# Patient Record
Sex: MALE | Race: WHITE | Employment: STUDENT | URBAN - METROPOLITAN AREA
[De-identification: names, ages, dates, MRNs, and addresses within clinical notes are randomized per-mention and may not be internally consistent; named-entity substitution may affect disease eponyms.]

---

## 2018-01-27 ENCOUNTER — APPOINTMENT (OUTPATIENT)
Dept: CT IMAGING | Age: 18
End: 2018-01-27
Attending: EMERGENCY MEDICINE
Payer: COMMERCIAL

## 2018-01-27 ENCOUNTER — HOSPITAL ENCOUNTER (EMERGENCY)
Age: 18
Discharge: HOME | End: 2018-01-27
Attending: EMERGENCY MEDICINE
Payer: COMMERCIAL

## 2018-01-27 VITALS
SYSTOLIC BLOOD PRESSURE: 128 MMHG | HEART RATE: 60 BPM | RESPIRATION RATE: 16 BRPM | DIASTOLIC BLOOD PRESSURE: 74 MMHG | HEIGHT: 70 IN | WEIGHT: 170 LBS | BODY MASS INDEX: 24.34 KG/M2 | OXYGEN SATURATION: 99 % | TEMPERATURE: 97.8 F

## 2018-01-27 DIAGNOSIS — R04.0 NOSEBLEED: Primary | ICD-10-CM

## 2018-01-27 PROCEDURE — 99283 EMERGENCY DEPT VISIT LOW MDM: CPT | Performed by: EMERGENCY MEDICINE

## 2018-01-27 PROCEDURE — 70486 CT MAXILLOFACIAL W/O DYE: CPT

## 2018-01-27 RX ORDER — DEXTROAMPHETAMINE SACCHARATE, AMPHETAMINE ASPARTATE, DEXTROAMPHETAMINE SULFATE AND AMPHETAMINE SULFATE 7.5; 7.5; 7.5; 7.5 MG/1; MG/1; MG/1; MG/1
30 TABLET ORAL DAILY
COMMUNITY

## 2018-01-27 RX ORDER — CEPHALEXIN 500 MG/1
500 CAPSULE ORAL 4 TIMES DAILY
Qty: 28 CAP | Refills: 0 | Status: SHIPPED | OUTPATIENT
Start: 2018-01-27 | End: 2018-02-03

## 2018-01-27 NOTE — ED PROVIDER NOTES
HPI Comments: Patient is a 22 yo male  at local school who presents with persistent nosebleed after being struck in the face with a baseball approximately 7 hours ago. Initially severe bleeding which has improved and by time of evaluation has stopped. Patient denies any LOC, no further injuries or complaints, well appearing, NAD. Patient is a 21 y.o. male presenting with nasal problem. The history is provided by the patient. No  was used. Nasal Injury    Pertinent negatives include no vomiting and no weakness. History reviewed. No pertinent past medical history. History reviewed. No pertinent surgical history. History reviewed. No pertinent family history. Social History     Social History    Marital status: SINGLE     Spouse name: N/A    Number of children: N/A    Years of education: N/A     Occupational History    Not on file. Social History Main Topics    Smoking status: Never Smoker    Smokeless tobacco: Current User    Alcohol use No    Drug use: No    Sexual activity: Not on file     Other Topics Concern    Not on file     Social History Narrative    No narrative on file         ALLERGIES: Review of patient's allergies indicates not on file. Review of Systems   Constitutional: Negative for chills and fever. HENT: Positive for nosebleeds. Negative for rhinorrhea and sore throat. Eyes: Negative for visual disturbance. Respiratory: Negative for cough and shortness of breath. Cardiovascular: Negative for chest pain and leg swelling. Gastrointestinal: Negative for abdominal pain, diarrhea, nausea and vomiting. Genitourinary: Negative for dysuria. Musculoskeletal: Negative for back pain and neck pain. Skin: Negative for rash. Neurological: Negative for weakness and headaches. Psychiatric/Behavioral: The patient is not nervous/anxious.         Vitals:    01/27/18 0108   BP: 127/76   Pulse: 94   Resp: 18   Temp: 98 °F (36.7 °C)   SpO2: 97%   Weight: 77.1 kg (170 lb)   Height: 5' 10\" (1.778 m)            Physical Exam   Constitutional: He is oriented to person, place, and time. He appears well-developed and well-nourished. HENT:   Head: Normocephalic. Right Ear: External ear normal.   Left Ear: External ear normal.   Eyes: Conjunctivae and EOM are normal. Pupils are equal, round, and reactive to light. Neck: Normal range of motion. Neck supple. No tracheal deviation present. Cardiovascular: Normal rate, regular rhythm, normal heart sounds and intact distal pulses. No murmur heard. Pulmonary/Chest: Effort normal and breath sounds normal. No respiratory distress. Abdominal: Soft. There is no tenderness. Musculoskeletal: Normal range of motion. Neurological: He is alert and oriented to person, place, and time. No cranial nerve deficit. Skin: No rash noted. Nursing note and vitals reviewed. MDM  Number of Diagnoses or Management Options  Nosebleed: new and requires workup     Amount and/or Complexity of Data Reviewed  Tests in the radiology section of CPT®: ordered and reviewed  Review and summarize past medical records: yes    Risk of Complications, Morbidity, and/or Mortality  Presenting problems: high  Diagnostic procedures: high  Management options: high    Patient Progress  Patient progress: stable    ED Course       Procedures    No results found for this or any previous visit (from the past 12 hour(s)). Ct Maxillofacial Wo Cont    Result Date: 1/27/2018  Clinical history: Hematoma, face. Continued nasal bleeding. TECHNIQUE: Axial, coronal and sagittal CT of the face without contrast. One or more of the following dose reduction techniques were used: automated exposure control, adjustment of the mA and/or kV according to patient size, use of iterative reconstruction technique. COMPARISON: None. FINDINGS: There is extensive comminuted nasal bone fracture. There is displacement.  There is also fracture of the nasal septum. There is associated nasal soft tissue swelling. There is hemorrhagic fluid in the nostrils. There is fracture through the right maxilla. There is large hemorrhagic fluid in the right maxillary sinus. There is depressed fracture of the right orbital floor. There is no herniation of orbital fat. There is no extraocular muscle entrapment. Globes are intact. The zygomatic arches, mandible, and a pterygoid processes are intact. Craniocervical junction is unremarkable. IMPRESSION: 1. Extensive comminuted nasal bone fracture as well as fracture of the nasal septum. Nasal bone fracture is contiguous with fracture through the right maxilla. Associated hemorrhagic fluid within the nostrils and the right maxillary sinus. 2. Depressed right orbital floor fracture. No significant herniation of the orbital fat. No extraocular muscle entrapment. Globes are intact. 3. Mandible, pterygoid processes and the zygomatic arches are intact. 20 yo male with nosebleed:    Bleeding has stopped and patient remains well appearing, NAD. He does have a small cut to external surface of nose which the physician at [facility] has already cleaned and glued so I will leave it as is. Discussed imaging findings with on call ENT Dr. Lexii Bailey who will see patient in office Monday (or patient will see Dr. Melita Garrison per  with patient who states they can always get in first thing due to him being the team doctor). Repeat exam with no entrapment of extra-ocular muscles, bleeding remains stopped, stable for discharge at this time.

## 2018-01-27 NOTE — DISCHARGE INSTRUCTIONS
Broken Nose: Care Instructions  Your Care Instructions    A broken nose is a break, or fracture, of the bone or cartilage. Most broken noses need only home care and a follow-up visit with a doctor. The swelling should go down in a few days. Bruises around your eyes and nose should go away in 2 to 3 weeks. You heal best when you take good care of yourself. Eat a variety of healthy foods, and don't smoke. Follow-up care is a key part of your treatment and safety. Be sure to make and go to all appointments, and call your doctor if you are having problems. It's also a good idea to know your test results and keep a list of the medicines you take. How can you care for yourself at home? · If you have a nasal splint or packing, leave it in place until a doctor removes it. · If your doctor prescribed antibiotics, take them as directed. Do not stop taking them just because you feel better. You need to take the full course of antibiotics. · Take decongestants as directed to help you breathe after the splint or packing is removed. Your doctor may give you a prescription or suggest over-the-counter medicine. · Be safe with medicines. Take pain medicines exactly as directed. ¨ If the doctor gave you a prescription medicine for pain, take it as prescribed. ¨ If you are not taking a prescription pain medicine, ask your doctor if you can take an over-the-counter medicine. · Put ice or a cold pack on your nose for 10 to 20 minutes at a time. Try to do this every 1 to 2 hours for the first 3 days (when you are awake) or until the swelling goes down. Put a thin cloth between the ice pack and your skin. · Sleep with your head slightly raised until the swelling goes down. Prop up your head and shoulders on pillows. · Do not play contact sports for 6 weeks. When should you call for help? Call 911 anytime you think you may need emergency care. For example, call if:  ? · You have trouble breathing.    ? · You passed out (lost consciousness). ?Call your doctor now or seek immediate medical care if:  ? · You have signs of infection, such as:  ¨ Increased pain, swelling, warmth, or redness. ¨ Red streaks leading from the area. ¨ Pus draining from the area. ¨ A fever. ? · You have clear fluid draining from your nose. ? · You have vision changes. ? · Your nose is bleeding. ? · You have new or worse pain. ? Watch closely for changes in your health, and be sure to contact your doctor if:  ? · You do not get better as expected. Where can you learn more? Go to http://lawanda-omar.info/. Enter Y345 in the search box to learn more about \"Broken Nose: Care Instructions. \"  Current as of: March 21, 2017  Content Version: 11.4  © 0550-7675 [employer]. Care instructions adapted under license by [employer] (which disclaims liability or warranty for this information). If you have questions about a medical condition or this instruction, always ask your healthcare professional. [place] [address] any warranty or liability for your use of this information.

## 2018-01-27 NOTE — ED NOTES
Discharge instructions and 0 prescriptions reviewed with pt. Pt verbalized understanding. VSS. Discharged home in stable condition, ambulatory with steady gait, accompanied by family.

## 2018-01-27 NOTE — ED TRIAGE NOTES
Pt complains of continued nose bleeding. Pt was struck by baseball at practice at approximately 1630.

## 2018-01-31 ENCOUNTER — HOSPITAL ENCOUNTER (OUTPATIENT)
Dept: SURGERY | Age: 18
Discharge: HOME | End: 2018-01-31

## 2018-02-01 VITALS — BODY MASS INDEX: 25.18 KG/M2 | WEIGHT: 170 LBS | HEIGHT: 69 IN

## 2018-02-01 RX ORDER — ACETAMINOPHEN 325 MG/1
TABLET ORAL
COMMUNITY

## 2018-02-01 NOTE — PERIOP NOTES
Patient verified name, , and surgery as listed in [facility]. Type 1b surgery, phone assessment complete. Orders NOT received. Labs per surgeon: none  Labs per anesthesia protocol: none      Patient answered medical/surgical history questions at their best of ability. All prior to admission medications documented in [facility]. Patient instructed to take the following medications the day of surgery according to anesthesia guidelines with a small sip of water: Adderall . Hold all vitamins 7 days prior to surgery and NSAIDS 5 days prior to surgery. Medications to be held none    Patient instructed on the following:  Arrive at A Entrance, time of arrival to be called the day before by 1700  NPO after midnight including gum, mints, and ice chips  Responsible adult must drive patient to the hospital, stay during surgery, and patient will  need supervision 24 hours after anesthesia  Use antibacterial soap in shower the night before surgery and on the morning of surgery  Leave all valuables (money and jewelry) at home but bring insurance card and ID on       DOS  Do not wear make-up, nail polish, lotions, cologne, perfumes, powders, or oil on skin. Patient teach back successful and patient demonstrates knowledge of instruction.

## 2018-02-04 ENCOUNTER — ANESTHESIA EVENT (OUTPATIENT)
Dept: SURGERY | Age: 18
End: 2018-02-04
Payer: COMMERCIAL

## 2018-02-05 ENCOUNTER — HOSPITAL ENCOUNTER (OUTPATIENT)
Age: 18
Setting detail: OUTPATIENT SURGERY
Discharge: HOME | End: 2018-02-05
Attending: OTOLARYNGOLOGY | Admitting: OTOLARYNGOLOGY
Payer: COMMERCIAL

## 2018-02-05 ENCOUNTER — ANESTHESIA (OUTPATIENT)
Dept: SURGERY | Age: 18
End: 2018-02-05
Payer: COMMERCIAL

## 2018-02-05 VITALS
OXYGEN SATURATION: 96 % | HEART RATE: 94 BPM | RESPIRATION RATE: 16 BRPM | WEIGHT: 168.43 LBS | TEMPERATURE: 99 F | SYSTOLIC BLOOD PRESSURE: 116 MMHG | HEIGHT: 69 IN | BODY MASS INDEX: 24.95 KG/M2 | DIASTOLIC BLOOD PRESSURE: 82 MMHG

## 2018-02-05 PROCEDURE — 77030008355 HC SPLNT NSL EXT MEDT -B: Performed by: OTOLARYNGOLOGY

## 2018-02-05 PROCEDURE — 76010000149 HC OR TIME 1 TO 1.5 HR: Performed by: OTOLARYNGOLOGY

## 2018-02-05 PROCEDURE — 74011250636 HC RX REV CODE- 250/636

## 2018-02-05 PROCEDURE — 74011250636 HC RX REV CODE- 250/636: Performed by: ANESTHESIOLOGY

## 2018-02-05 PROCEDURE — 76060000033 HC ANESTHESIA 1 TO 1.5 HR: Performed by: OTOLARYNGOLOGY

## 2018-02-05 PROCEDURE — 74011000250 HC RX REV CODE- 250

## 2018-02-05 PROCEDURE — 76210000016 HC OR PH I REC 1 TO 1.5 HR: Performed by: OTOLARYNGOLOGY

## 2018-02-05 PROCEDURE — 76210000021 HC REC RM PH II 0.5 TO 1 HR: Performed by: OTOLARYNGOLOGY

## 2018-02-05 PROCEDURE — 77030020782 HC GWN BAIR PAWS FLX 3M -B: Performed by: ANESTHESIOLOGY

## 2018-02-05 PROCEDURE — 77030008357 HC SPLNT NSL INT THOM -B: Performed by: OTOLARYNGOLOGY

## 2018-02-05 PROCEDURE — 74011000250 HC RX REV CODE- 250: Performed by: ANESTHESIOLOGY

## 2018-02-05 PROCEDURE — 77030002986 HC SUT PROL J&J -A: Performed by: OTOLARYNGOLOGY

## 2018-02-05 PROCEDURE — 74011250637 HC RX REV CODE- 250/637: Performed by: ANESTHESIOLOGY

## 2018-02-05 PROCEDURE — 77030002916 HC SUT ETHLN J&J -A: Performed by: OTOLARYNGOLOGY

## 2018-02-05 PROCEDURE — 77030031139 HC SUT VCRL2 J&J -A: Performed by: OTOLARYNGOLOGY

## 2018-02-05 PROCEDURE — 77030014007 HC SPNG HEMSTAT J&J -B: Performed by: OTOLARYNGOLOGY

## 2018-02-05 PROCEDURE — 74011000250 HC RX REV CODE- 250: Performed by: OTOLARYNGOLOGY

## 2018-02-05 PROCEDURE — 77030006907 HC BLD SNUS SHV MEDT -C: Performed by: OTOLARYNGOLOGY

## 2018-02-05 PROCEDURE — 77030008703 HC TU ET UNCUF COVD -A: Performed by: ANESTHESIOLOGY

## 2018-02-05 PROCEDURE — 77030008477 HC STYL SATN SLP COVD -A: Performed by: ANESTHESIOLOGY

## 2018-02-05 PROCEDURE — 74011250637 HC RX REV CODE- 250/637: Performed by: OTOLARYNGOLOGY

## 2018-02-05 PROCEDURE — 77030018836 HC SOL IRR NACL ICUM -A: Performed by: OTOLARYNGOLOGY

## 2018-02-05 PROCEDURE — 77030002888 HC SUT CHRMC J&J -A: Performed by: OTOLARYNGOLOGY

## 2018-02-05 RX ORDER — SODIUM CHLORIDE, SODIUM LACTATE, POTASSIUM CHLORIDE, CALCIUM CHLORIDE 600; 310; 30; 20 MG/100ML; MG/100ML; MG/100ML; MG/100ML
75 INJECTION, SOLUTION INTRAVENOUS CONTINUOUS
Status: DISCONTINUED | OUTPATIENT
Start: 2018-02-05 | End: 2018-02-05 | Stop reason: HOSPADM

## 2018-02-05 RX ORDER — LIDOCAINE HYDROCHLORIDE 10 MG/ML
0.1 INJECTION INFILTRATION; PERINEURAL AS NEEDED
Status: DISCONTINUED | OUTPATIENT
Start: 2018-02-05 | End: 2018-02-05 | Stop reason: HOSPADM

## 2018-02-05 RX ORDER — OXYCODONE HYDROCHLORIDE 5 MG/1
5 TABLET ORAL
Status: DISCONTINUED | OUTPATIENT
Start: 2018-02-05 | End: 2018-02-05 | Stop reason: HOSPADM

## 2018-02-05 RX ORDER — ACETAMINOPHEN 500 MG
1000 TABLET ORAL ONCE
Status: COMPLETED | OUTPATIENT
Start: 2018-02-05 | End: 2018-02-05

## 2018-02-05 RX ORDER — OXYMETAZOLINE HCL 0.05 %
SPRAY, NON-AEROSOL (ML) NASAL AS NEEDED
Status: DISCONTINUED | OUTPATIENT
Start: 2018-02-05 | End: 2018-02-05 | Stop reason: HOSPADM

## 2018-02-05 RX ORDER — HYDROMORPHONE HYDROCHLORIDE 2 MG/ML
0.5 INJECTION, SOLUTION INTRAMUSCULAR; INTRAVENOUS; SUBCUTANEOUS
Status: COMPLETED | OUTPATIENT
Start: 2018-02-05 | End: 2018-02-05

## 2018-02-05 RX ORDER — ONDANSETRON 2 MG/ML
INJECTION INTRAMUSCULAR; INTRAVENOUS AS NEEDED
Status: DISCONTINUED | OUTPATIENT
Start: 2018-02-05 | End: 2018-02-05 | Stop reason: HOSPADM

## 2018-02-05 RX ORDER — SUCCINYLCHOLINE CHLORIDE 20 MG/ML
INJECTION INTRAMUSCULAR; INTRAVENOUS AS NEEDED
Status: DISCONTINUED | OUTPATIENT
Start: 2018-02-05 | End: 2018-02-05 | Stop reason: HOSPADM

## 2018-02-05 RX ORDER — DEXAMETHASONE SODIUM PHOSPHATE 4 MG/ML
INJECTION, SOLUTION INTRA-ARTICULAR; INTRALESIONAL; INTRAMUSCULAR; INTRAVENOUS; SOFT TISSUE AS NEEDED
Status: DISCONTINUED | OUTPATIENT
Start: 2018-02-05 | End: 2018-02-05 | Stop reason: HOSPADM

## 2018-02-05 RX ORDER — MIDAZOLAM HYDROCHLORIDE 1 MG/ML
2 INJECTION, SOLUTION INTRAMUSCULAR; INTRAVENOUS
Status: DISCONTINUED | OUTPATIENT
Start: 2018-02-05 | End: 2018-02-05 | Stop reason: HOSPADM

## 2018-02-05 RX ORDER — LIDOCAINE HYDROCHLORIDE AND EPINEPHRINE 10; 10 MG/ML; UG/ML
INJECTION, SOLUTION INFILTRATION; PERINEURAL AS NEEDED
Status: DISCONTINUED | OUTPATIENT
Start: 2018-02-05 | End: 2018-02-05 | Stop reason: HOSPADM

## 2018-02-05 RX ORDER — ROCURONIUM BROMIDE 10 MG/ML
INJECTION, SOLUTION INTRAVENOUS AS NEEDED
Status: DISCONTINUED | OUTPATIENT
Start: 2018-02-05 | End: 2018-02-05 | Stop reason: HOSPADM

## 2018-02-05 RX ORDER — LIDOCAINE HYDROCHLORIDE 20 MG/ML
INJECTION, SOLUTION EPIDURAL; INFILTRATION; INTRACAUDAL; PERINEURAL AS NEEDED
Status: DISCONTINUED | OUTPATIENT
Start: 2018-02-05 | End: 2018-02-05 | Stop reason: HOSPADM

## 2018-02-05 RX ORDER — FENTANYL CITRATE 50 UG/ML
100 INJECTION, SOLUTION INTRAMUSCULAR; INTRAVENOUS ONCE
Status: DISCONTINUED | OUTPATIENT
Start: 2018-02-05 | End: 2018-02-05 | Stop reason: HOSPADM

## 2018-02-05 RX ORDER — FENTANYL CITRATE 50 UG/ML
INJECTION, SOLUTION INTRAMUSCULAR; INTRAVENOUS AS NEEDED
Status: DISCONTINUED | OUTPATIENT
Start: 2018-02-05 | End: 2018-02-05 | Stop reason: HOSPADM

## 2018-02-05 RX ORDER — KETOROLAC TROMETHAMINE 30 MG/ML
INJECTION, SOLUTION INTRAMUSCULAR; INTRAVENOUS AS NEEDED
Status: DISCONTINUED | OUTPATIENT
Start: 2018-02-05 | End: 2018-02-05 | Stop reason: HOSPADM

## 2018-02-05 RX ORDER — NALOXONE HYDROCHLORIDE 0.4 MG/ML
0.2 INJECTION, SOLUTION INTRAMUSCULAR; INTRAVENOUS; SUBCUTANEOUS AS NEEDED
Status: DISCONTINUED | OUTPATIENT
Start: 2018-02-05 | End: 2018-02-05 | Stop reason: HOSPADM

## 2018-02-05 RX ORDER — MIDAZOLAM HYDROCHLORIDE 1 MG/ML
2 INJECTION, SOLUTION INTRAMUSCULAR; INTRAVENOUS ONCE
Status: COMPLETED | OUTPATIENT
Start: 2018-02-05 | End: 2018-02-05

## 2018-02-05 RX ORDER — PROPOFOL 10 MG/ML
INJECTION, EMULSION INTRAVENOUS AS NEEDED
Status: DISCONTINUED | OUTPATIENT
Start: 2018-02-05 | End: 2018-02-05 | Stop reason: HOSPADM

## 2018-02-05 RX ORDER — GABAPENTIN 300 MG/1
300 CAPSULE ORAL ONCE
Status: COMPLETED | OUTPATIENT
Start: 2018-02-05 | End: 2018-02-05

## 2018-02-05 RX ADMIN — ROCURONIUM BROMIDE 5 MG: 10 INJECTION, SOLUTION INTRAVENOUS at 13:06

## 2018-02-05 RX ADMIN — ACETAMINOPHEN 1000 MG: 500 TABLET, FILM COATED ORAL at 12:00

## 2018-02-05 RX ADMIN — LIDOCAINE HYDROCHLORIDE 0.1 ML: 10 INJECTION, SOLUTION INFILTRATION; PERINEURAL at 11:59

## 2018-02-05 RX ADMIN — KETOROLAC TROMETHAMINE 30 MG: 30 INJECTION, SOLUTION INTRAMUSCULAR; INTRAVENOUS at 13:44

## 2018-02-05 RX ADMIN — SUCCINYLCHOLINE CHLORIDE 180 MG: 20 INJECTION INTRAMUSCULAR; INTRAVENOUS at 13:06

## 2018-02-05 RX ADMIN — SODIUM CHLORIDE, SODIUM LACTATE, POTASSIUM CHLORIDE, AND CALCIUM CHLORIDE: 600; 310; 30; 20 INJECTION, SOLUTION INTRAVENOUS at 13:24

## 2018-02-05 RX ADMIN — SODIUM CHLORIDE, SODIUM LACTATE, POTASSIUM CHLORIDE, AND CALCIUM CHLORIDE 75 ML/HR: 600; 310; 30; 20 INJECTION, SOLUTION INTRAVENOUS at 11:59

## 2018-02-05 RX ADMIN — GABAPENTIN 300 MG: 300 CAPSULE ORAL at 12:00

## 2018-02-05 RX ADMIN — HYDROMORPHONE HYDROCHLORIDE 0.5 MG: 2 INJECTION, SOLUTION INTRAMUSCULAR; INTRAVENOUS; SUBCUTANEOUS at 14:30

## 2018-02-05 RX ADMIN — PROPOFOL 260 MG: 10 INJECTION, EMULSION INTRAVENOUS at 13:06

## 2018-02-05 RX ADMIN — HYDROMORPHONE HYDROCHLORIDE 0.5 MG: 2 INJECTION, SOLUTION INTRAMUSCULAR; INTRAVENOUS; SUBCUTANEOUS at 14:35

## 2018-02-05 RX ADMIN — FENTANYL CITRATE 100 MCG: 50 INJECTION, SOLUTION INTRAMUSCULAR; INTRAVENOUS at 13:06

## 2018-02-05 RX ADMIN — DEXAMETHASONE SODIUM PHOSPHATE 10 MG: 4 INJECTION, SOLUTION INTRA-ARTICULAR; INTRALESIONAL; INTRAMUSCULAR; INTRAVENOUS; SOFT TISSUE at 13:12

## 2018-02-05 RX ADMIN — MIDAZOLAM HYDROCHLORIDE 2 MG: 1 INJECTION, SOLUTION INTRAMUSCULAR; INTRAVENOUS at 12:00

## 2018-02-05 RX ADMIN — LIDOCAINE HYDROCHLORIDE 80 MG: 20 INJECTION, SOLUTION EPIDURAL; INFILTRATION; INTRACAUDAL; PERINEURAL at 13:06

## 2018-02-05 RX ADMIN — ONDANSETRON 4 MG: 2 INJECTION INTRAMUSCULAR; INTRAVENOUS at 13:12

## 2018-02-05 NOTE — ANESTHESIA POSTPROCEDURE EVALUATION
Post-Anesthesia Evaluation and Assessment    Patient: Triston King MRN: [id number]  SSN: [id number]    YOB: 2000  Age: 21 y.o. Sex: male       Cardiovascular Function/Vital Signs  Visit Vitals    /82    Pulse 94    Temp 37.2 °C (99 °F)    Resp 16    Ht 5' 9\" (1.753 m)    Wt 76.4 kg (168 lb 6.9 oz)    SpO2 96%    BMI 24.87 kg/m2       Patient is status post general anesthesia for Procedure(s):  NASAL FRACTURE CLOSED REDUCTION  OPEN REDUCTION SEPTAL FRACTURE  TURBINATE REDUCTION. Nausea/Vomiting: None    Postoperative hydration reviewed and adequate. Pain:  Pain Scale 1: Visual (02/05/18 1504)  Pain Intensity 1: 0 (02/05/18 1504)   Managed    Neurological Status:   Neuro (WDL): Within Defined Limits (02/05/18 1448)  Neuro  Neurologic State: Drowsy (02/05/18 1448)  Orientation Level: Oriented to person;Oriented to place;Oriented to situation (02/05/18 1407)  Cognition: Follows commands (02/05/18 1407)  LUE Motor Response: Purposeful (02/05/18 1407)  LLE Motor Response: Purposeful (02/05/18 1407)  RUE Motor Response: Purposeful (02/05/18 1407)  RLE Motor Response: Purposeful (02/05/18 1407)   At baseline    Mental Status and Level of Consciousness: Arousable    Pulmonary Status:   O2 Device: Room air (02/05/18 1504)   Adequate oxygenation and airway patent    Complications related to anesthesia: None    Post-anesthesia assessment completed. No concerns. Doing well.      Signed By: Rubina Bryan MD     February 5, 2018

## 2018-02-05 NOTE — IP AVS SNAPSHOT
[address] [address] 
[phone] Patient: Jonathon Torres MRN: [id number] EPV:[id number] About your hospitalization You were admitted on:  February 5, 2018 You last received care in the:  [facility] PACU You were discharged on:  February 5, 2018 Why you were hospitalized Your primary diagnosis was:  Not on File Follow-up Information Follow up With Details Comments Contact Info Terry Caldera DO Schedule an appointment as soon as possible for a visit in 1 week(s) Please call for follow-up appointment. T[address] [facility] [address] 
[phone] Your Scheduled Appointments Tuesday February 13, 2018  1:30 PM EST  
POST OP with [facility] Dis[facility] ENT) [address] [place] [address]  
[phone] Discharge Orders None A check emi indicates which time of day the medication should be taken. My Medications ASK your doctor about these medications Instructions Each Dose to Equal  
 Morning Noon Evening Bedtime ADDERALL 30 mg tablet Generic drug:  dextroamphetamine-amphetamine Your last dose was: Your next dose is: Take 30 mg by mouth dailyIndications: Attention-Deficit Hyperactivity Disorder. Takes 1 in morning and 1/2 tab in afternoon if needed' Take / use AM day of surgery  per anesthesia protocols. 30 mg  
    
   
   
   
  
 amoxicillin 500 mg capsule Commonly known as:  AMOXIL Ask about: Should I take this medication? Your last dose was: Your next dose is: Take 1 Cap by mouth two (2) times a day for 5 days. 500 mg HYDROcodone-acetaminophen 5-325 mg per tablet Commonly known as:  Mickie Apa Your last dose was: Your next dose is: Take 1-2 tablets every 4-6 hours for pain ondansetron 4 mg disintegrating tablet Commonly known as:  ZOFRAN ODT Your last dose was: Your next dose is: Take 1 Tab by mouth every eight (8) hours as needed for Nausea. 4 mg TYLENOL 325 mg tablet Generic drug:  acetaminophen Your last dose was: Your next dose is: Take  by mouth every four (4) hours as needed for Pain. Indications: Pain Discharge Instructions INSTRUCTIONS FOLLOWING SINUS SURGERY 
 
ACTIVITY  Bathe or shower as directed by your doctor.  Avoid strenuous work and becoming overheated. Activity as directed by your doctor  Avoid bending over. DIET  Clear, cool liquids the first day; then soft diet the second day  Advance to regular diet on third day, unless your doctor orders otherwise.  No milk products or foods with red color dyes  If nausea and vomiting continues, call your doctor. PAIN 
 Take pain medication as directed by your doctor.  Call your doctor if pain is NOT relieved by medication.  DO NOT take aspirin or blood thinners until directed by your doctor. FOLLOW-UP PHONE CALLS  Calls will be made by nursing staff  If you have any problems, call your doctor as needed. CALL YOUR DOCTOR IF  Bleeding is expected the first few days and should gradually clear.  Temperature of 10 1°F or above  Green or yellow discharge from nose  Stiff neck, changes in vision or mental status, confusion, or excessive drowsiness AFTER ANESTHESIA  For the first 24 hours: DO NOT Drive, Drink alcoholic beverages, or Make important decisions.  Be aware of dizziness following anesthesia and while taking pain medication. Introducing \[facility]\"" & HEALTH SERVICES! [address] introduces Bin1 ATE patient portal. Now you can access parts of your medical record, email your doctor's office, and request medication refills online. 1. In your internet browser, go to [email or web address] 2. Click on the First Time User? Click Here link in the Sign In box. You will see the New Member Sign Up page. 3. Enter your Promethean Access Code exactly as it appears below. You will not need to use this code after youve completed the sign-up process. If you do not sign up before the expiration date, you must request a new code. · Promethean Access Code: [email or web address] Expires: 4/27/2018 12:33 AM 
 
4. Enter the last four digits of your Social Security Number (xxxx) and Date of Birth (mm/dd/yyyy) as indicated and click Submit. You will be taken to the next sign-up page. 5. Create a Promethean ID. This will be your Promethean login ID and cannot be changed, so think of one that is secure and easy to remember. 6. Create a Promethean password. You can change your password at any time. 7. Enter your Password Reset Question and Answer. This can be used at a later time if you forget your password. 8. Enter your e-mail address. You will receive e-mail notification when new information is available in [address] 9. Click Sign Up. You can now view and download portions of your medical record. 10. Click the Download Summary menu link to download a portable copy of your medical information. If you have questions, please visit the Frequently Asked Questions section of the Promethean website. Remember, Promethean is NOT to be used for urgent needs. For medical emergencies, dial 911. Now available from your iPhone and Android! Providers Seen During Your Hospitalization Provider Specialty Primary office phone Lucretia Jha DO Otolaryngology [phone] Your Primary Care Physician (PCP) Primary Care Physician Office Phone Office Fax NONE ** None ** ** None ** You are allergic to the following No active allergies Recent Documentation Height Weight BMI Smoking Status 1.753 m (41 %, Z= -0.22)* 76.4 kg (69 %, Z= 0.50)* 24.87 kg/m2 (72 %, Z= 0.57)* Never Smoker *Growth percentiles are based on [facility] 2-20 Years data. Emergency Contacts Name Discharge Info Relation Home Work Mobile Laurie Stewart  Parent [1] [phone] Patient Belongings The following personal items are in your possession at time of discharge: 
  Dental Appliances: None         Home Medications: None   Jewelry: None  Clothing: Footwear, Shirt, Pants Please provide this summary of care documentation to your next provider. Signatures-by signing, you are acknowledging that this After Visit Summary has been reviewed with you and you have received a copy. Patient Signature:  ____________________________________________________________ Date:  ____________________________________________________________  
  
[facility] Payor Provider Signature:  ____________________________________________________________ Date:  ____________________________________________________________

## 2018-02-05 NOTE — OP NOTES
[address] 63  OPERATIVE REPORT    Pacheco Ruiz  MR#: [id number]  : 2000  ACCOUNT #: [de-identified]   DATE OF SERVICE: 2018    PREOPERATIVE DIAGNOSES  1. Nasal bone fracture. 2.  Nasal septal fracture. 3.  Nasal obstruction. 4. Inferior turbinate hypertrophy. 5.  Right facial fractures. POSTOPERATIVE DIAGNOSES  1. Nasal bone fracture. 2.  Nasal septal fracture. 3.  Nasal obstruction. 4. Inferior turbinate hypertrophy. 5.  Right facial fractures. PROCEDURES PERFORMED  1. Open reduction of septal fracture. 2.  Closed reduction of nasal bone fracture. 3.  Bilateral submucosal resection of the inferior turbinates. SURGEON:  Mal Sandifer, DO.      ASSISTANT:  None. ANESTHESIA:  General.    COMPLICATIONS:  None. ESTIMATED BLOOD LOSS:  20 mL. HISTORY OF PRESENT ILLNESS:  A 12-year-old young man who came to see me in the office approximately 10 days ago. He is a  and he got hit in the face with a baseball. He suffered multiple facial fractures, nasal fracture, and a septal fracture with now chronic nasal obstruction and congestion. Had a lot of bruising and ended up going to the ER, showed a depressed right nasal bone, possible fracture of the left nasal bone. He has a septal fracture and multiple right-sided facial fractures including an orbital floor fracture also involving the medial wall of the maxillary sinus. He did have some bleeding initially after. The pain has been pretty minimal.  Overall, was doing okay. He had no complaints about vision or any other issues with that, but again came in for potential repair. He has an obvious facial deformity with swelling of the right face, ecchymosis in the periorbital area. He has a little bit of subconjunctival bleeding around the right eye, but again there were no vision changes.   He has an obviously deformed nasal bone, which is again appears to be mainly a depressed right nasal bone and then inspection of the nose revealed cartilage inside the left side of the nose due to what appears to be an acute septal fracture along with bilateral inferior turbinate hypertrophy. So, based on that, it was my recommendation that he undergo an open reduction of his septal fracture, closed reduction of the nasal bone fracture, inferior turbinate reduction, the fractures for the orbital floor and lamina papyracea along the medial wall of maxillary sinus, although there were multiple fractures. These all appeared to be nondisplaced. So, I did not recommend that he do anything with those. So, the procedure, risks and benefits were discussed with him and his parents in the office. All questions were answered and they were agreeable to the surgery. SURGERY ITSELF:   The patient was identified in the preoperative waiting area, he was taken back to the operating room where he underwent general anesthesia. Approximately 3 mL of 1% lidocaine with epinephrine were injected into the dorsum of the nose through an intranasal approach, the inferior turbinates and septum bilaterally. Afrin soaked pledgets were placed in each side of the nose and left there for a few minutes. These were then removed. A right-sided hemitransfixion incision was made in the anterior septum. A left-sided submucoperichondrial and submucoperiosteal flap was then raised. I came back to the bony cartilaginous junction, broke through that and raised a right-sided submucoperiosteal flap. The anterior 2/3 of the septum was just piecemeal with multiple small pieces of both cartilage and bone. I was able to remove all of the piecemeal pieces until the septum was back in the midline position. He had a prominent maxillary crest, so using a combination of a caudal elevator, Vini forceps and open Bal-Jimenez, I was able to isolate and remove the deviated portions of nasal septum.   A hammer and chisel was also used to remove a portion of the maxillary crest, and 4-0 chromic was then used to reapproximate the septal flaps and this was also used to close the hemitransfixion incision. A knife was then used to make a small stab incision in the anterior portion of the inferior turbinates. Mickeal Kai was used to create a small pocket between the bone of the inferior turbinate mucosa medially and a microdebrider with a turbinate blade was used to reduce the prominent bone and tissue of the inferior turbinates bilaterally. Boies elevator was used to medialize and lateralize the inferior turbinates giving the patient a widely patent nasal airway. Then, taking the Boies elevator measuring on the outside of the nose, I was able to determine the depth of the nasal bone and thickening intranasally. I was able to manipulate the right nasal bone back into its original position. Again, there were multiple small fractures. The bone itself was again almost like a piecemeal and I was able to take the bones in general and put them back into a position that seemed to mirror the left side. With only a mild pressure on the outside of the left nasal bone,  I was able to push this into a more medial position suggesting that there was a bilateral nasal bone fracture. So, then again leaving the left side pushed in position and then getting the right side to match, I then took Surgicel placed high in the nasal cavity to help stent out the right nasal bones to match the left nasal bones. Then, once that was complete, the dorsum of the nose was cleaned off. Mastisol was used over the dorsum of the nose. Once it was dry, the Steri-Strips were cut to the right size, placed over the dorsum of the nose, applied a thermoplast splint, which was then cut to size, heated up in hot water to make it pliable and then shaped, then held over the dorsum of the nose, once it started to cool, and then held in place until it was completely full.   Some blood was suctioned from the nose and nasopharynx and then the patient was awakened. He was taken to the postop recovery room in stable condition.       DO LUISITO Chanel / KIANA  D: 02/05/2018 14:02     T: 02/05/2018 14:33  JOB #: [id number]

## 2018-02-05 NOTE — ANESTHESIA PREPROCEDURE EVALUATION
Anesthetic History   No history of anesthetic complications            Review of Systems / Medical History  Patient summary reviewed and pertinent labs reviewed    Pulmonary  Within defined limits                 Neuro/Psych   Within defined limits           Cardiovascular  Within defined limits                Exercise tolerance: >4 METS  Comments: Denies CV history   GI/Hepatic/Renal  Within defined limits              Endo/Other  Within defined limits           Other Findings              Physical Exam    Airway  Mallampati: II  TM Distance: 4 - 6 cm  Neck ROM: normal range of motion   Mouth opening: Normal     Cardiovascular    Rhythm: regular  Rate: normal         Dental    Dentition: Caps/crowns     Pulmonary  Breath sounds clear to auscultation               Abdominal  GI exam deferred       Other Findings            Anesthetic Plan    ASA: 1  Anesthesia type: general          Induction: Intravenous  Anesthetic plan and risks discussed with: Patient, Mother and Father

## (undated) DEVICE — BLADE 1882040 5PK INFERIOR TURB 2MM

## (undated) DEVICE — MEDI-VAC NON-CONDUCTIVE SUCTION TUBING: Brand: CARDINAL HEALTH

## (undated) DEVICE — 2000CC GUARDIAN II: Brand: GUARDIAN

## (undated) DEVICE — SOLUTION IV 1000ML 0.9% SOD CHL

## (undated) DEVICE — (D)STRIP SKN CLSR 0.5X4IN WHT --

## (undated) DEVICE — CODMAN® SURGICAL PATTIES 1" X 3" (2.54CM X 7.62CM): Brand: CODMAN®

## (undated) DEVICE — HEAD AND NECK: Brand: MEDLINE INDUSTRIES, INC.

## (undated) DEVICE — PACKING 8004008 NEURAY 200PK 25X76MM: Brand: NEURAY ®

## (undated) DEVICE — SUT ETHLN 3-0 18IN PS1 BLK --

## (undated) DEVICE — SUT CHRMC 4-0 27IN RB1 BRN --

## (undated) DEVICE — AGENT HEMSTAT W2XL14IN OXIDIZED REGENERATED CELOS ABSRB FOR

## (undated) DEVICE — SUTURE ABSORBABLE BRAIDED 6-0 P-3 18 IN UD VICRYL J492G

## (undated) DEVICE — SUTURE PROL SZ 6-0 L18IN NONABSORBABLE BLU P-3 L13MM 3/8 8695G

## (undated) DEVICE — SPLINT NSL SEPTAL SUPP REG PRE PUNCHED HOLE SIL STRL BRTH EZ

## (undated) DEVICE — MASTISOL ADHESIVE LIQ 2/3ML

## (undated) DEVICE — SPLINT 1529010 10PK THERMASPLINT MEDIUM